# Patient Record
Sex: FEMALE | Race: OTHER | NOT HISPANIC OR LATINO | Employment: UNEMPLOYED | URBAN - METROPOLITAN AREA
[De-identification: names, ages, dates, MRNs, and addresses within clinical notes are randomized per-mention and may not be internally consistent; named-entity substitution may affect disease eponyms.]

---

## 2017-11-17 PROBLEM — F41.1 GAD (GENERALIZED ANXIETY DISORDER): Status: ACTIVE | Noted: 2017-11-17

## 2017-11-17 PROBLEM — F32.A DEPRESSION: Status: ACTIVE | Noted: 2017-11-17

## 2017-11-17 PROBLEM — F41.0 PANIC DISORDER: Status: ACTIVE | Noted: 2017-11-17

## 2017-11-17 PROBLEM — F43.10 PTSD (POST-TRAUMATIC STRESS DISORDER): Status: ACTIVE | Noted: 2017-11-17

## 2017-11-17 PROBLEM — F50.81 BINGE EATING DISORDER: Status: ACTIVE | Noted: 2017-11-17

## 2017-11-19 PROBLEM — F33.3 SEVERE EPISODE OF RECURRENT MAJOR DEPRESSIVE DISORDER, WITH PSYCHOTIC FEATURES: Status: ACTIVE | Noted: 2017-11-19

## 2021-08-25 PROBLEM — U07.1 COVID-19: Status: ACTIVE | Noted: 2020-12-01

## 2021-08-25 PROBLEM — J32.2 CHRONIC ETHMOIDAL SINUSITIS: Status: ACTIVE | Noted: 2020-08-27

## 2021-08-25 PROBLEM — J32.0 CHRONIC MAXILLARY SINUSITIS: Status: ACTIVE | Noted: 2020-08-27

## 2021-08-25 PROBLEM — B00.9 HSV-1 (HERPES SIMPLEX VIRUS 1) INFECTION: Status: ACTIVE | Noted: 2021-08-25

## 2021-08-25 PROBLEM — I10 CHRONIC HYPERTENSION: Status: ACTIVE | Noted: 2021-08-25

## 2021-08-25 PROBLEM — O09.529 AMA (ADVANCED MATERNAL AGE) MULTIGRAVIDA 35+: Status: ACTIVE | Noted: 2021-08-25

## 2021-08-25 PROBLEM — J34.2 DEVIATED NASAL SEPTUM: Status: ACTIVE | Noted: 2020-08-27

## 2021-08-25 PROBLEM — Z87.51 HISTORY OF PRETERM DELIVERY: Status: ACTIVE | Noted: 2021-08-25

## 2021-08-25 PROBLEM — F41.9 ANXIETY: Status: ACTIVE | Noted: 2021-03-12

## 2021-08-25 PROBLEM — K21.9 GASTROESOPHAGEAL REFLUX DISEASE: Status: ACTIVE | Noted: 2020-04-15

## 2021-08-25 PROBLEM — F32.A DEPRESSION: Status: ACTIVE | Noted: 2021-08-25

## 2021-08-25 PROBLEM — Z86.73 HISTORY OF STROKE: Status: ACTIVE | Noted: 2021-08-25

## 2021-08-25 PROBLEM — I63.9 CEREBROVASCULAR ACCIDENT: Status: ACTIVE | Noted: 2021-02-22

## 2021-10-13 PROBLEM — O09.529 AMA (ADVANCED MATERNAL AGE) MULTIGRAVIDA 35+: Chronic | Status: ACTIVE | Noted: 2021-08-25

## 2021-10-13 PROBLEM — Z86.73 HISTORY OF STROKE: Chronic | Status: ACTIVE | Noted: 2021-08-25

## 2021-10-13 PROBLEM — B00.9 HSV-1 (HERPES SIMPLEX VIRUS 1) INFECTION: Chronic | Status: ACTIVE | Noted: 2021-08-25

## 2021-10-13 PROBLEM — I10 CHRONIC HYPERTENSION: Chronic | Status: ACTIVE | Noted: 2021-08-25

## 2021-10-13 PROBLEM — Q27.30 AVM (ARTERIOVENOUS MALFORMATION): Status: ACTIVE | Noted: 2020-02-01

## 2021-10-13 PROBLEM — Z87.51 HISTORY OF PRETERM DELIVERY: Chronic | Status: ACTIVE | Noted: 2021-08-25

## 2021-10-13 PROBLEM — Q27.30 AVM (ARTERIOVENOUS MALFORMATION): Chronic | Status: ACTIVE | Noted: 2020-02-01

## 2021-10-13 PROBLEM — F32.A DEPRESSION: Chronic | Status: ACTIVE | Noted: 2021-08-25

## 2021-10-13 PROBLEM — U07.1 COVID-19: Chronic | Status: ACTIVE | Noted: 2020-12-01

## 2024-03-15 ENCOUNTER — TELEPHONE (OUTPATIENT)
Dept: PEDIATRICS | Facility: CLINIC | Age: 39
End: 2024-03-15

## 2024-03-15 NOTE — TELEPHONE ENCOUNTER
----- Message from Zelalem Espinoza LPN sent at 3/14/2024 11:33 AM CDT -----  Good morning!    I have a patient that would like to schedule with Dr. Espinoza. She lives in [place]. I was unable to schedule. Can someone in your clinic please help with getting this patient set up?. Patient has Medicare as her Primary Ins.    Thank you!

## 2024-03-15 NOTE — TELEPHONE ENCOUNTER
Called pt to find out if she is trying to make an appointment for her self or a child since Dr. Espinoza is a ped. Left voicemail

## 2024-04-18 ENCOUNTER — OFFICE VISIT (OUTPATIENT)
Dept: GASTROENTEROLOGY | Facility: CLINIC | Age: 39
End: 2024-04-18
Payer: MEDICARE

## 2024-04-18 VITALS — WEIGHT: 208.75 LBS | BODY MASS INDEX: 34.78 KG/M2 | HEIGHT: 65 IN

## 2024-04-18 DIAGNOSIS — K21.9 GASTROESOPHAGEAL REFLUX DISEASE, UNSPECIFIED WHETHER ESOPHAGITIS PRESENT: ICD-10-CM

## 2024-04-18 DIAGNOSIS — R10.30 LOWER ABDOMINAL PAIN: Primary | ICD-10-CM

## 2024-04-18 DIAGNOSIS — R19.5 LOOSE BOWEL MOVEMENTS: ICD-10-CM

## 2024-04-18 PROCEDURE — 99204 OFFICE O/P NEW MOD 45 MIN: CPT | Mod: S$PBB,,, | Performed by: NURSE PRACTITIONER

## 2024-04-18 PROCEDURE — 99999 PR PBB SHADOW E&M-EST. PATIENT-LVL IV: CPT | Mod: PBBFAC,,, | Performed by: NURSE PRACTITIONER

## 2024-04-18 PROCEDURE — 99214 OFFICE O/P EST MOD 30 MIN: CPT | Mod: PBBFAC,PN | Performed by: NURSE PRACTITIONER

## 2024-04-18 RX ORDER — NORTRIPTYLINE HYDROCHLORIDE 50 MG/1
50 CAPSULE ORAL NIGHTLY
COMMUNITY

## 2024-04-18 RX ORDER — AMLODIPINE AND BENAZEPRIL HYDROCHLORIDE 5; 20 MG/1; MG/1
1 CAPSULE ORAL DAILY
COMMUNITY

## 2024-04-18 RX ORDER — PANTOPRAZOLE SODIUM 40 MG/1
40 TABLET, DELAYED RELEASE ORAL DAILY PRN
Qty: 30 TABLET | Refills: 0 | Status: SHIPPED | OUTPATIENT
Start: 2024-04-18 | End: 2024-05-18

## 2024-04-18 RX ORDER — DICYCLOMINE HYDROCHLORIDE 20 MG/1
20 TABLET ORAL 4 TIMES DAILY PRN
Qty: 120 TABLET | Refills: 0 | Status: SHIPPED | OUTPATIENT
Start: 2024-04-18 | End: 2024-05-18

## 2024-04-18 NOTE — PATIENT INSTRUCTIONS
Start daily fiber supplement such as Metamucil. You can take 3 capsules once a day OR mix 1 tablespoon in 7-8 ounces of water.     Trial IBgard (peppermint extract). This is over the counter.     Dicyclomine sent to pharmacy . Take up to four times a day as needed for abdominal cramping.     Pantoprazole once a day as needed for heartburn/reflux.     Conservative measures:  Sitz baths  Fiber  Avoid straining  Avoid sitting for prolonged periods with bowel movement

## 2024-04-18 NOTE — PROGRESS NOTES
GASTROENTEROLOGY CLINIC NOTE    Chief Complaint: The primary encounter diagnosis was Lower abdominal pain. Diagnoses of Gastroesophageal reflux disease, unspecified whether esophagitis present and Loose bowel movements were also pertinent to this visit.  Referring provider/PCP: No, Primary Doctor    Mann Andrea is a 38 y.o. female who is a new patient to me who presents for reflux, lower abdominal pain, and loose bowel movements.       Reflux: Intermittent reflux/heartburn occurring once every 1-2 weeks. Worse two years ago with pregnancy. Responded to Protonix.   Occurs 1-2 hours after eating.   Dysphagia/Odynophagia: No  Nausea/Vomiting: No  Appetite Changes: No  Abdominal Pain: Lower contraction like cramping pain with loose stool. Pain improves with bowel movements.   Bowel Habits: Bowel movements daily to every other day mostly [place] Type 2. Once a week will have [place] Type 5-6 stool accompanied by lower abdominal cramping and incomplete emptying.   GI Bleeding: Denies hematochezia, hematemesis, melena, Black/tarry stool, coffee ground emesis  Occasional bright red blood on tissue with wiping when having looser bowel movements.   Unexplained Weight Loss: No     GLP-1s: No  NSAIDs: No  Anticoagulation or Antiplatelet: No      History of H.pylori:  H.pylori Treatment:  Prior Upper Endoscopy: no  Prior Colonoscopy: no  Family h/o Colon Cancer: No  Family h/o Crohn's Disease or Ulcerative Colitis: No  Family h/o Celiac Sprue: No  Abdominal Surgeries: no    Review of Systems   Constitutional:  Negative for weight loss.   HENT:  Negative for sore throat.    Eyes:  Negative for blurred vision.   Respiratory:  Negative for cough.    Cardiovascular:  Negative for chest pain.   Gastrointestinal:  Positive for abdominal pain (lower abdomen) and heartburn (occasional). Negative for blood in stool, constipation, diarrhea, melena, nausea and vomiting.   Genitourinary:  Negative for dysuria.   Musculoskeletal:   Negative for myalgias.   Skin:  Negative for rash.   Neurological:  Negative for headaches.   Endo/Heme/Allergies:  Negative for environmental allergies.   Psychiatric/Behavioral:  Negative for suicidal ideas. The patient is not nervous/anxious.        Past Medical History: has a past medical history of ADHD (attention deficit hyperactivity disorder), Anxiety, AVM (arteriovenous malformation), Cerebrovascular accident, COVID-19 vaccine series completed, Depression, History of  delivery, History of psychiatric hospitalization, HSV infection, HTN (hypertension), Donna, Panic disorder, Psychiatric exam requested by authority, PTSD (post-traumatic stress disorder), Stroke, and Uterine fibroid.    Past Surgical History: has a past surgical history that includes Sinus surgery (2020); Craniotomy (2020); elective  (2021); and Brain surgery.    Family History:family history includes Sickle cell anemia in her mother.    Allergies: Review of patient's allergies indicates:  No Known Allergies    Social History: reports that she has never smoked. She has never used smokeless tobacco. She reports that she does not currently use alcohol after a past usage of about 2.0 standard drinks of alcohol per week. She reports that she does not use drugs.    Home medications:   Current Outpatient Medications on File Prior to Visit   Medication Sig Dispense Refill    amlodipine-benazepril 5-20 mg (LOTREL) 5-20 mg per capsule Take 1 capsule by mouth once daily.      budesonide (PULMICORT) 0.5 mg/2 mL nebulizer solution Combined one dose into [facility] Saline irrigations. Repeat the Budesonide steroid irrigations twice daily. 120 mL 11    butalbital-acetaminophen-caff -40 mg Cap       FEROSUL 325 mg (65 mg iron) Tab tablet TAKE 1 TABLET(325 MG) BY MOUTH EVERY DAY 30 tablet 6    hydroCHLOROthiazide (MICROZIDE) 12.5 mg capsule hydrochlorothiazide 12.5 mg capsule   TAKE ONE CAPSULE BY MOUTH TWICE DAILY       "NIFEdipine (ADALAT CC) 30 MG TbSR TAKE 1 TABLET BY MOUTH EVERY DAY 30 tablet 11    NIFEdipine (PROCARDIA-XL) 30 MG (OSM) 24 hr tablet Take 30 mg by mouth.      nortriptyline (PAMELOR) 50 MG capsule Take 50 mg by mouth every evening.      ondansetron (ZOFRAN-ODT) 8 MG TbDL ondansetron 8 mg disintegrating tablet   DISSOLVE 1 TABLET ON THE TONGUE THREE TIMES DAILY AS NEEDED FOR NAUSEA      sumatriptan (IMITREX) 100 MG tablet Take 100 mg by mouth daily as needed.       27 mg iron- 1 mg Tab Take 1 tablet by mouth once daily.      [DISCONTINUED] famotidine (PEPCID) 20 MG tablet famotidine 20 mg tablet   TAKE 1 TABLET BY MOUTH TWICE DAILY      [DISCONTINUED] aspirin 81 MG Chew Take 81 mg by mouth once daily. (Patient not taking: Reported on 4/18/2024)      [DISCONTINUED] labetaloL (NORMODYNE) 100 MG tablet TAKE 1 TABLET(100 MG) BY MOUTH TWICE DAILY (Patient not taking: Reported on 4/18/2024) 60 tablet 3    [DISCONTINUED] labetaloL (NORMODYNE) 200 MG tablet TAKE 1 TABLET(200 MG) BY MOUTH TWICE DAILY (Patient not taking: Reported on 4/18/2024) 60 tablet 11    [DISCONTINUED] pantoprazole (PROTONIX) 40 MG tablet Take 1 tablet (40 mg total) by mouth once daily. (Patient not taking: Reported on 1/17/2022) 30 tablet 2     Current Facility-Administered Medications on File Prior to Visit   Medication Dose Route Frequency Provider Last Rate Last Admin    copper intrauterine device 380 square mm IUD 1 Intra Uterine Device  1 Intra Uterine Device Intrauterine Once Ysabel Andrade MD        copper intrauterine device 380 square mm  mm  380 mm Intrauterine  Ysabel Andrade MD   380 mm at 02/02/22 1010       Vital signs:  Ht 5' 5" (1.651 m)   Wt 94.7 kg (208 lb 12.4 oz)   BMI 34.74 kg/m²     Physical Exam  Vitals reviewed.   Constitutional:       Appearance: Normal appearance.   HENT:      Head: Normocephalic.   Pulmonary:      Effort: Pulmonary effort is normal. No respiratory distress.   Abdominal:      " "General: There is no distension.      Palpations: Abdomen is soft.      Tenderness: There is no abdominal tenderness.   Skin:     General: Skin is warm.   Neurological:      Mental Status: She is alert and oriented to person, place, and time.   Psychiatric:         Behavior: Behavior normal.         Thought Content: Thought content normal.         Routine labs:  Lab Results   Component Value Date    WBC 8.25 11/29/2021    HGB 10.8 (A) 11/29/2021    HCT 33.2 (A) 11/29/2021    MCV 95.1 11/29/2021     11/29/2021     Lab Results   Component Value Date    INR 1.0 08/19/2020     No results found for: "IRON", "FERRITIN", "TIBC", "FESATURATED"  Lab Results   Component Value Date     08/27/2020    K 3.9 08/27/2020     08/27/2020    CO2 25 08/27/2020    BUN 16 08/27/2020    CREATININE 0.8 01/02/2022     Lab Results   Component Value Date    ALBUMIN 4.0 08/19/2020    ALT 7 08/19/2020    AST 10 08/19/2020    BILITOT 0.4 08/19/2020     Lab Results   Component Value Date    GLUCOSE 110 09/20/2021     Lab Results   Component Value Date    TSH 0.33 (A) 03/05/2022     Lab Results   Component Value Date    CALCIUM 9.2 08/27/2020       Imaging:      I have reviewed prior labs, imaging, and notes.      Assessment:  1. Lower abdominal pain    2. Gastroesophageal reflux disease, unspecified whether esophagitis present    3. Loose bowel movements      Lower abdominal cramping with loose bowel movements. Improves following bowel movement.   Once a week will have loose stool.   Intermittent reflux.     Plan:  Orders Placed This Encounter    pantoprazole (PROTONIX) 40 MG tablet    dicyclomine (BENTYL) 20 mg tablet     Protonix  Bentyl as needed  IBgard  Start Metamucil     If symptoms do not improve consider egd +/- colonoscopy    Plan of care discussed with patient who is in agreement and verbalized understanding.     I have explained the planned procedures to the patient.The risks, benefits and alternatives of the " procedure were also explained in detail. Patient verbalized understanding, all questions were answered. The patient agrees to proceed as planned    Follow Up: MARC Espitia, SERGE,FNP-BC  [facility] [place]    (Portions of this note were dictated using voice recognition software and may contain dictation related errors in spelling/grammar/syntax not found on text review)